# Patient Record
Sex: MALE | Race: WHITE | Employment: FULL TIME | URBAN - METROPOLITAN AREA
[De-identification: names, ages, dates, MRNs, and addresses within clinical notes are randomized per-mention and may not be internally consistent; named-entity substitution may affect disease eponyms.]

---

## 2020-02-20 DIAGNOSIS — K91.2 POSTOPERATIVE MALABSORPTION: Primary | ICD-10-CM

## 2020-03-13 ENCOUNTER — HOSPITAL ENCOUNTER (OUTPATIENT)
Dept: LAB | Age: 75
Discharge: HOME | End: 2020-03-13
Payer: COMMERCIAL

## 2020-03-13 DIAGNOSIS — K91.2 POSTOPERATIVE MALABSORPTION: ICD-10-CM

## 2020-03-13 LAB
25(OH)D3 SERPL-MCNC: 24.9 NG/ML (ref 30–100)
ALBUMIN SERPL-MCNC: 3.6 G/DL (ref 3.4–5)
ANION GAP SERPL CALC-SCNC: 6 MMOL/L (ref 3–18)
BASOPHILS # BLD: 0 K/UL (ref 0–0.1)
BASOPHILS NFR BLD: 1 % (ref 0–2)
BUN SERPL-MCNC: 28 MG/DL (ref 7–18)
BUN/CREAT SERPL: 22 (ref 12–20)
CALCIUM SERPL-MCNC: 8.3 MG/DL (ref 8.5–10.1)
CHLORIDE SERPL-SCNC: 108 MMOL/L (ref 100–111)
CO2 SERPL-SCNC: 26 MMOL/L (ref 21–32)
CREAT SERPL-MCNC: 1.29 MG/DL (ref 0.6–1.3)
DIFFERENTIAL METHOD BLD: ABNORMAL
EOSINOPHIL # BLD: 0.1 K/UL (ref 0–0.4)
EOSINOPHIL NFR BLD: 2 % (ref 0–5)
ERYTHROCYTE [DISTWIDTH] IN BLOOD BY AUTOMATED COUNT: 21 % (ref 11.6–14.5)
FERRITIN SERPL-MCNC: 23 NG/ML (ref 8–388)
FOLATE SERPL-MCNC: 18.7 NG/ML (ref 3.1–17.5)
GLUCOSE SERPL-MCNC: 93 MG/DL (ref 74–99)
HCT VFR BLD AUTO: 33.3 % (ref 36–48)
HGB BLD-MCNC: 9.1 G/DL (ref 13–16)
IRON SERPL-MCNC: 25 UG/DL (ref 50–175)
LYMPHOCYTES # BLD: 1 K/UL (ref 0.9–3.6)
LYMPHOCYTES NFR BLD: 18 % (ref 21–52)
MCH RBC QN AUTO: 19.7 PG (ref 24–34)
MCHC RBC AUTO-ENTMCNC: 27.3 G/DL (ref 31–37)
MCV RBC AUTO: 72.2 FL (ref 74–97)
MONOCYTES # BLD: 0.5 K/UL (ref 0.05–1.2)
MONOCYTES NFR BLD: 9 % (ref 3–10)
NEUTS SEG # BLD: 3.9 K/UL (ref 1.8–8)
NEUTS SEG NFR BLD: 70 % (ref 40–73)
PLATELET # BLD AUTO: 237 K/UL (ref 135–420)
POTASSIUM SERPL-SCNC: 4.5 MMOL/L (ref 3.5–5.5)
RBC # BLD AUTO: 4.61 M/UL (ref 4.7–5.5)
SODIUM SERPL-SCNC: 140 MMOL/L (ref 136–145)
VIT B12 SERPL-MCNC: 983 PG/ML (ref 211–911)
WBC # BLD AUTO: 5.5 K/UL (ref 4.6–13.2)

## 2020-03-13 PROCEDURE — 82040 ASSAY OF SERUM ALBUMIN: CPT

## 2020-03-13 PROCEDURE — 83540 ASSAY OF IRON: CPT

## 2020-03-13 PROCEDURE — 82607 VITAMIN B-12: CPT

## 2020-03-13 PROCEDURE — 82306 VITAMIN D 25 HYDROXY: CPT

## 2020-03-13 PROCEDURE — 36415 COLL VENOUS BLD VENIPUNCTURE: CPT

## 2020-03-13 PROCEDURE — 85025 COMPLETE CBC W/AUTO DIFF WBC: CPT

## 2020-03-13 PROCEDURE — 80048 BASIC METABOLIC PNL TOTAL CA: CPT

## 2020-03-13 PROCEDURE — 82728 ASSAY OF FERRITIN: CPT

## 2020-03-13 PROCEDURE — 84425 ASSAY OF VITAMIN B-1: CPT

## 2020-03-16 LAB — VIT B1 BLD-SCNC: 157.2 NMOL/L (ref 66.5–200)

## 2020-03-17 ENCOUNTER — TELEPHONE (OUTPATIENT)
Dept: SURGERY | Age: 75
End: 2020-03-17

## 2020-03-18 ENCOUNTER — OFFICE VISIT (OUTPATIENT)
Dept: SURGERY | Age: 75
End: 2020-03-18

## 2020-03-18 VITALS
DIASTOLIC BLOOD PRESSURE: 57 MMHG | OXYGEN SATURATION: 97 % | BODY MASS INDEX: 39.76 KG/M2 | HEIGHT: 73 IN | WEIGHT: 300 LBS | SYSTOLIC BLOOD PRESSURE: 156 MMHG | HEART RATE: 48 BPM | TEMPERATURE: 97.1 F

## 2020-03-18 DIAGNOSIS — E66.01 SEVERE OBESITY (HCC): ICD-10-CM

## 2020-03-18 DIAGNOSIS — R06.02 SHORTNESS OF BREATH: Primary | ICD-10-CM

## 2020-03-18 DIAGNOSIS — K91.2 POSTOPERATIVE INTESTINAL MALABSORPTION: ICD-10-CM

## 2020-03-18 RX ORDER — CARVEDILOL 6.25 MG/1
TABLET ORAL 2 TIMES DAILY WITH MEALS
COMMUNITY

## 2020-03-18 RX ORDER — WARFARIN 2 MG/1
2 TABLET ORAL DAILY
COMMUNITY

## 2020-03-18 RX ORDER — WARFARIN SODIUM 5 MG/1
5 TABLET ORAL DAILY
COMMUNITY

## 2020-03-18 NOTE — PROGRESS NOTES
Lenora Roland is a 69 y.o. male (: 1945) presenting to address:    Chief Complaint   Patient presents with    New Patient     GBP in        Medication list and allergies have been reviewed with Lenora Luan and updated as of today's date. I have gone over all Medical, Surgical and Social History with Lenora Roland and updated/added the information accordingly.

## 2020-03-18 NOTE — PROGRESS NOTES
Consult    Patient: Karrie Lamb MRN: [id number]  SSN: [id number]    YOB: 1945  Age: 69 y.o. Sex: male      Subjective: Karrie Lamb is a 69 y.o. white male status post laparoscopic gastric bypass 2013 who presents in follow-up primarily with a complaint of a 4-month history of shortness of breath for which she has been evaluated by his primary care physician as well as his cardiologist without determining any etiology. The patient has a significant history of a laparoscopic gastric bypass in 2013 and has not been compliant and apparently had a decreased hematocrit for which she was begun on iron and vitamin C as well as vitamin B12 for iron and B12 deficiency by his primary care physician without change in his symptomatology. Patient presents today for bariatric opinion in regard to potential etiology of his pulmonary symptoms. 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 Current weight: 300. BMI: 39.  Estimated weight loss: 42. Current weight loss: 45  Goal weight: 203. Percentage weight loss: 32% / 7 years  Past Medical History:   Diagnosis Date    Hypertension      Past Surgical History:   Procedure Laterality Date    HX AORTIC VALVE REPLACEMENT        No family history on file.   Social History     Tobacco Use    Smoking status: Never Smoker    Smokeless tobacco: Never Used   Substance Use Topics    Alcohol use: Not Currently      Current Outpatient Medications   Medication Sig Dispense Refill    warfarin (COUMADIN) 2 mg tablet Take 2 mg by mouth daily.  warfarin (COUMADIN) 5 mg tablet Take 5 mg by mouth daily.  carvediloL (Coreg) 6.25 mg tablet Take  by mouth two (2) times daily (with meals). Not on File    Review of Systems:      General: Denies fevers, chills, night sweats, fatigue, weight loss, or weight gain. HEENT: Denies changes in auditory or visual acuity, recurrent pharyngitis, epistaxis, chronic rhinorrhea    Respiratory: Denies increasing shortness of breath, productive cough, hemoptysis    Cardiac: Denies known history of cardiac disease, heart murmur, palpitations    GI: Denies dysphagia, recurrent emesis, hematemesis, changes in bowel habits, hematochezia, melena    : Denies hematuria frequency urgency dysuria    Musculoskeletal: Denies fractures, dislocations    Neurologic: Denies history of CVA, paralysis paresthesias, recurrent cephalgia, seizures    Endocrine: Denies polyuria, polydipsia, polyphagia    Lymph/heme: Denies a history of malignancy, anemia    Integumentary: Negative for dermatitis           Objective:     Vitals:    03/18/20 1548   BP: 156/57   Pulse: (!) 48   Temp: 97.1 °F (36.2 °C)   SpO2: 97%   Weight: 136.1 kg (300 lb)   Height: 6' 1\" (1.854 m)        Physical Exam:    General: Morbidly obese,  in no acute distress, nontoxic in appearance. Head: Normocephalic, atraumatic  Mouth: Clear, no overt lesions, oral mucosa is pink and moist.  Neck: Supple, no masses, no adenopathy or carotid bruits, trachea midline  Resp: Clear to auscultation bilaterally, no wheezing, rhonchi, or rales, excursions normal and symmetrical.  Cardio: Regular rate and rhythm, no murmurs, clicks, gallops, or rubs. Abdomen: Soft, nontender, nondistended, normoactive bowel sounds, no hernias.   Extremities: Warm, well perfused, no tenderness or swelling, normal gait/station, without edema or varicosities  Neuro: Sensation and strength grossly intact and symmetrical.  Psych: Alert and oriented to person, place, and time. March 13, 2020 CBC, CMP, iron, vitamin B1, B12, folate, vitamin D                            Hematocrit 33.3, hemoglobin 9.1, iron 25, BUN 28, calcium 8.3, vitamin D 24.9 with remainder laboratory parameters within normal limits      Assessment:     1. History of laparoscopic gastric bypass 2013 with initial success and now failing with subsequent weight regain secondary to noncompliance with the bariatric surgical principles  2. Shortness of breath of unknown etiology    Plan:     1. Obtain an upper GI to assure pouch of appropriate size without gastro-gastric fistula       Formal bariatric nutrition reevaluation       Consultation with medical weight loss/Andree Delgado       Discussed the importance of adherence to bariatric dietary exercise and vitamin supplementation principles and reviewed bariatric key principles chart with the patient       We will repeat labs and see the patient in 3 months for ongoing follow-up  2.   Pulmonary consultation in regard to shortness of breath  Signed By: Georgie Wilde DO     March 18, 2020

## 2020-03-19 ENCOUNTER — TELEPHONE (OUTPATIENT)
Dept: SURGERY | Age: 75
End: 2020-03-19

## 2020-03-19 NOTE — TELEPHONE ENCOUNTER
Patient came into the office on 3/18/20 for an appointment. During that time I reviewed his lab results. Per Dr. Liliam Herrera I advised the patient to begin taking Vit D 3 5,000 iu daily, Iron 65 mg, and Vit C 500 mg, 30 mins before taking iron.  Patient verbalized understanding.       ----- Message from Charly Pickard DO sent at 3/14/2020  9:57 AM EDT -----  Needs Vit D, Iron and vitamin C supplementation